# Patient Record
Sex: MALE | Race: WHITE | URBAN - NONMETROPOLITAN AREA
[De-identification: names, ages, dates, MRNs, and addresses within clinical notes are randomized per-mention and may not be internally consistent; named-entity substitution may affect disease eponyms.]

---

## 2017-06-15 ENCOUNTER — OFFICE VISIT - GICH (OUTPATIENT)
Dept: FAMILY MEDICINE | Facility: OTHER | Age: 47
End: 2017-06-15

## 2017-06-15 ENCOUNTER — HISTORY (OUTPATIENT)
Dept: FAMILY MEDICINE | Facility: OTHER | Age: 47
End: 2017-06-15

## 2017-06-15 DIAGNOSIS — F41.9 ANXIETY DISORDER: ICD-10-CM

## 2017-06-15 DIAGNOSIS — G89.29 OTHER CHRONIC PAIN: ICD-10-CM

## 2017-06-15 DIAGNOSIS — F25.0 SCHIZOAFFECTIVE DISORDER, BIPOLAR TYPE (H): ICD-10-CM

## 2017-06-15 DIAGNOSIS — M54.17 RADICULOPATHY OF LUMBOSACRAL REGION: ICD-10-CM

## 2017-06-15 DIAGNOSIS — M54.2 CERVICALGIA: ICD-10-CM

## 2017-06-15 DIAGNOSIS — F17.219 CIGARETTE NICOTINE DEPENDENCE WITH NICOTINE-INDUCED DISORDER: ICD-10-CM

## 2017-06-15 DIAGNOSIS — F32.9 MAJOR DEPRESSIVE DISORDER, SINGLE EPISODE: ICD-10-CM

## 2017-06-15 DIAGNOSIS — G43.911 INTRACTABLE MIGRAINE WITH STATUS MIGRAINOSUS: ICD-10-CM

## 2017-06-15 DIAGNOSIS — F31.77 BIPOLAR DISORDER, IN PARTIAL REMISSION, MOST RECENT EPISODE MIXED (H): ICD-10-CM

## 2017-06-15 ASSESSMENT — ANXIETY QUESTIONNAIRES
7. FEELING AFRAID AS IF SOMETHING AWFUL MIGHT HAPPEN: NOT AT ALL
5. BEING SO RESTLESS THAT IT IS HARD TO SIT STILL: SEVERAL DAYS
6. BECOMING EASILY ANNOYED OR IRRITABLE: SEVERAL DAYS
GAD7 TOTAL SCORE: 2
2. NOT BEING ABLE TO STOP OR CONTROL WORRYING: NOT AT ALL
3. WORRYING TOO MUCH ABOUT DIFFERENT THINGS: NOT AT ALL
4. TROUBLE RELAXING: NOT AT ALL
1. FEELING NERVOUS, ANXIOUS, OR ON EDGE: NOT AT ALL

## 2017-06-15 ASSESSMENT — PATIENT HEALTH QUESTIONNAIRE - PHQ9: SUM OF ALL RESPONSES TO PHQ QUESTIONS 1-9: 2

## 2017-06-19 ENCOUNTER — COMMUNICATION - GICH (OUTPATIENT)
Dept: FAMILY MEDICINE | Facility: OTHER | Age: 47
End: 2017-06-19

## 2017-06-19 DIAGNOSIS — H60.90 OTITIS EXTERNA: ICD-10-CM

## 2017-06-22 ENCOUNTER — COMMUNICATION - GICH (OUTPATIENT)
Dept: FAMILY MEDICINE | Facility: OTHER | Age: 47
End: 2017-06-22

## 2017-07-06 ENCOUNTER — COMMUNICATION - GICH (OUTPATIENT)
Dept: FAMILY MEDICINE | Facility: OTHER | Age: 47
End: 2017-07-06

## 2017-07-06 DIAGNOSIS — M54.41 LOW BACK PAIN WITH RIGHT-SIDED SCIATICA: ICD-10-CM

## 2017-07-06 DIAGNOSIS — G89.29 OTHER CHRONIC PAIN: ICD-10-CM

## 2017-07-06 DIAGNOSIS — G89.4 CHRONIC PAIN SYNDROME: ICD-10-CM

## 2017-07-06 DIAGNOSIS — M54.42 LOW BACK PAIN WITH LEFT-SIDED SCIATICA: ICD-10-CM

## 2017-07-07 ENCOUNTER — COMMUNICATION - GICH (OUTPATIENT)
Dept: FAMILY MEDICINE | Facility: OTHER | Age: 47
End: 2017-07-07

## 2017-07-07 ENCOUNTER — AMBULATORY - GICH (OUTPATIENT)
Dept: SCHEDULING | Facility: OTHER | Age: 47
End: 2017-07-07

## 2017-07-07 DIAGNOSIS — F32.9 MAJOR DEPRESSIVE DISORDER, SINGLE EPISODE: ICD-10-CM

## 2017-07-14 ENCOUNTER — AMBULATORY - GICH (OUTPATIENT)
Dept: LAB | Facility: OTHER | Age: 47
End: 2017-07-14

## 2017-07-14 ENCOUNTER — COMMUNICATION - GICH (OUTPATIENT)
Dept: FAMILY MEDICINE | Facility: OTHER | Age: 47
End: 2017-07-14

## 2017-07-14 DIAGNOSIS — M54.17 RADICULOPATHY OF LUMBOSACRAL REGION: ICD-10-CM

## 2017-07-17 ENCOUNTER — COMMUNICATION - GICH (OUTPATIENT)
Dept: FAMILY MEDICINE | Facility: OTHER | Age: 47
End: 2017-07-17

## 2017-07-17 DIAGNOSIS — F32.9 MAJOR DEPRESSIVE DISORDER, SINGLE EPISODE: ICD-10-CM

## 2017-07-20 ENCOUNTER — COMMUNICATION - GICH (OUTPATIENT)
Dept: FAMILY MEDICINE | Facility: OTHER | Age: 47
End: 2017-07-20

## 2017-07-20 DIAGNOSIS — F32.9 MAJOR DEPRESSIVE DISORDER, SINGLE EPISODE: ICD-10-CM

## 2017-07-21 LAB
6-MONOACETYL MORPHINE - HISTORICAL: ABNORMAL NG/ML
AMPHETAMINE URINE - HISTORICAL: ABNORMAL NG/ML
BARBITURATE URINE - HISTORICAL: ABNORMAL NG/ML
BENZODIAZEPINE URINE - HISTORICAL: ABNORMAL NG/ML
BUPRENORPHRINE URINE - HISTORICAL: ABNORMAL NG/ML
COCAINE METAB URINE - HISTORICAL: ABNORMAL NG/ML
CREAT UR - HISTORICAL: 88 MG/DL
ETHYLGLUCURONIDE URINE - HISTORICAL: ABNORMAL NG/ML
FENTANYL URINE - HISTORICAL: ABNORMAL NG/ML
MASS SPECTROMETRY URINE - HISTORICAL: ABNORMAL
METHADONE URINE - HISTORICAL: ABNORMAL NG/ML
OPIATES URINE - HISTORICAL: ABNORMAL NG/ML
OXYCODONE URINE - HISTORICAL: ABNORMAL NG/ML
PH URINE - HISTORICAL: 7.5
PROPOXYPHENE URINE - HISTORICAL: ABNORMAL NG/ML
THC 50 URINE - HISTORICAL: ABNORMAL NG/ML
TRAMADOL - HISTORICAL: ABNORMAL NG/ML

## 2017-07-22 ENCOUNTER — AMBULATORY - GICH (OUTPATIENT)
Dept: FAMILY MEDICINE | Facility: OTHER | Age: 47
End: 2017-07-22

## 2017-07-26 ENCOUNTER — COMMUNICATION - GICH (OUTPATIENT)
Dept: FAMILY MEDICINE | Facility: OTHER | Age: 47
End: 2017-07-26

## 2017-07-26 DIAGNOSIS — F32.9 MAJOR DEPRESSIVE DISORDER, SINGLE EPISODE: ICD-10-CM

## 2017-07-27 ENCOUNTER — COMMUNICATION - GICH (OUTPATIENT)
Dept: FAMILY MEDICINE | Facility: OTHER | Age: 47
End: 2017-07-27

## 2017-07-27 DIAGNOSIS — K21.9 GASTRO-ESOPHAGEAL REFLUX DISEASE WITHOUT ESOPHAGITIS: ICD-10-CM

## 2017-09-17 ENCOUNTER — COMMUNICATION - GICH (OUTPATIENT)
Dept: FAMILY MEDICINE | Facility: OTHER | Age: 47
End: 2017-09-17

## 2017-09-17 DIAGNOSIS — F32.9 MAJOR DEPRESSIVE DISORDER, SINGLE EPISODE: ICD-10-CM

## 2017-12-28 NOTE — TELEPHONE ENCOUNTER
Patient Information     Patient Name MRN Sex Torres Mason [id number] Male 1970      Telephone Encounter by Ning Boss at 2017  9:49 AM     Author:  Ning Boss Service:  (none) Author Type:  (none)     Filed:  2017  9:51 AM Encounter Date:  2017 Status:  Signed     :  Ning Boss            Spoke with patient's wife and notified of RX sent to Ivonne Boss LPN......................2017 9:50 AM

## 2017-12-28 NOTE — TELEPHONE ENCOUNTER
Patient Information     Patient Name MRN Sex Torres Mason [id number] Male 1970      Telephone Encounter by Julia Huber MD at 2017  6:05 PM     Author:  Julia Huber MD Service:  (none) Author Type:  Physician     Filed:  2017  6:06 PM Encounter Date:  2017 Status:  Signed     :  Julia Huber MD (Physician)            Can try cipro hc

## 2017-12-28 NOTE — TELEPHONE ENCOUNTER
Patient Information     Patient Name MRN Sex Torres Mason [id number] Male 1970      Telephone Encounter by Ning Boss at 2017  8:20 AM     Author:  Ning Boss Service:  (none) Author Type:  (none)     Filed:  2017  8:23 AM Encounter Date:  2017 Status:  Signed     :  Ning Boss            Spoke with Jes, she stated when Torres was seen at his apt. On 6/15/17 Julia Huber MD   noticed his right ear was swollen and was going to prescribe some drops for it. Would like RX sent to [facility] to be picked up today if possible. Ning Boss LPN......................2017 8:22 AM

## 2017-12-28 NOTE — TELEPHONE ENCOUNTER
Patient Information     Patient Name MRN Sex Torres Mason [id number] Male 1970      Telephone Encounter by Julia Huber MD at 2017  9:15 AM     Author:  Julia Huber MD Service:  (none) Author Type:  Physician     Filed:  2017  9:16 AM Encounter Date:  2017 Status:  Signed     :  Julia Huber MD (Physician)            I do not see a question .

## 2017-12-28 NOTE — TELEPHONE ENCOUNTER
Patient Information     Patient Name MRN Sex Torres Mason [id number] Male 1970      Telephone Encounter by Julia Huber MD at 2017  9:46 AM     Author:  Julia Huber MD Service:  (none) Author Type:  Physician     Filed:  2017  9:47 AM Encounter Date:  2017 Status:  Signed     :  Julia Huber MD (Physician)            Done

## 2017-12-28 NOTE — TELEPHONE ENCOUNTER
Patient Information     Patient Name MRN Sex Torres Mason [id number] Male 1970      Telephone Encounter by Enoch Coleman LPN at 2017  8:12 AM     Author:  Enoch Coleman LPN Service:  (none) Author Type:  NURS- Licensed Practical Nurse     Filed:  2017  8:13 AM Encounter Date:  2017 Status:  Signed     :  Enoch Coleman LPN (NURS- Licensed Practical Nurse)            Attempted to reach patient. No answer/machine.  Enoch Coleman LPN ..............2017 8:13 AM

## 2017-12-28 NOTE — TELEPHONE ENCOUNTER
Patient Information     Patient Name MRN Sex Torres Mason [id number] Male 1970      Telephone Encounter by Philly Porras RN at 2017  4:09 PM     Author:  Philly Porras RN Service:  (none) Author Type:  NURS- Registered Nurse     Filed:  2017  4:18 PM Encounter Date:  2017 Status:  Signed     :  Philly Porras RN (NURS- Registered Nurse)            Depression-in adults 18 and over  Serotonin/Norepinephrine Reuptake Inhibitors    Office visit in the past 12 months or as indicated in chart.  Should have clinic visit 1-2 months after initial prescription.    Last visit with TERRANCE MONTOYA was on: 06/15/2017 in [facility]  Next visit with TERRANCE MONTOYA is on: No future appointment listed with this provider  Next visit with Family Practice is on: No future appointment listed in this department    PHQ Depression Screening 6/15/2017   Date of PHQ exam (doc flow) 6/15/2017   1. Lack of interest/pleasure 0 - Not at all   2. Feeling down/depressed 0 - Not at all   PHQ-2 TOTAL SCORE 0   3. Trouble sleeping 1 - Several days   4. Decreased energy 1 - Several days   5. Appetite change 0 - Not at all   6. Feelings of failure 0 - Not at all   7. Trouble concentrating 0 - Not at all   8. Activity level 0 - Not at all   9. Hurting yourself 0 - Not at all   PHQ-9 TOTAL SCORE 2   PHQ-9 Severity Level none   Functional Impairment not difficult at all   Some recent data might be hidden      Prescription refilled per RN Medication Refill Policy.................... Philly Porras RN ....................  2017   4:18 PM

## 2017-12-28 NOTE — TELEPHONE ENCOUNTER
Patient Information     Patient Name MRN Sex Torres Mason [id number] Male 1970      Telephone Encounter by Erendira Briones RN at 2017  2:51 PM     Author:  Erendira Briones RN Service:  (none) Author Type:  NURS- Registered Nurse     Filed:  2017  2:53 PM Encounter Date:  2017 Status:  Signed     :  Erendira Briones RN (NURS- Registered Nurse)            This is a Refill request from: [facility]   Name of Medication: Trazodone  Quantity requested: 30  Last fill date: 9/15/2015   Due for refill: yes  Last visit with JULIA HUBER was on: 06/15/2017 in [facility]  PCP:  Julia Huber MD  Controlled Substance Agreement:  n/a   Diagnosis r/t this medication request: depression     Unable to complete prescription refill per RN Medication Refill Policy.................... Erendira Briones RN ....................  2017   2:51 PM

## 2017-12-28 NOTE — TELEPHONE ENCOUNTER
Patient Information     Patient Name MRN Sex Torres Mason [id number] Male 1970      Telephone Encounter by Diane Martinez at 2017  2:10 PM     Author:  Diane Martinez Service:  (none) Author Type:  NURS- Student Practical Nurse     Filed:  2017  2:10 PM Encounter Date:  2017 Status:  Signed     :  Diane Martinez (NURS- Student Practical Nurse)            Called and after proper verification, informed Jes of below message from PCP. Patient states understanding and no further questions at this time.    Diane Martinez ....................  2017   2:10 PM

## 2017-12-28 NOTE — TELEPHONE ENCOUNTER
Patient Information     Patient Name MRN Sex Torres Mason [id number] Male 1970      Telephone Encounter by Sophie Medina at 2017  3:09 PM     Author:  Sophie Medina Service:  (none) Author Type:  (none)     Filed:  2017  3:09 PM Encounter Date:  2017 Status:  Signed     :  Sophie Medina            OPEN CALL    Encounter remains open, please close if appropriate. Thank you!    Sophie Medina ....................  2017   3:09 PM

## 2017-12-28 NOTE — PROGRESS NOTES
Patient Information     Patient Name MRN Sex Torres Mason [id number] Male 1970      Progress Notes by Julia Huber MD at 6/15/2017  1:45 PM     Author:  Julia Huber MD Service:  (none) Author Type:  Physician     Filed:  2017 11:24 PM Encounter Date:  6/15/2017 Status:  Signed     :  Julia Huber MD (Physician)            Nursing Notes:   Jessica Boss  6/15/2017  2:41 PM  Signed  Patient states that he is here for medication refills and he states he is not here for a physical.   Patient moved back home from [place].    PHQ Depression Screening 2015 6/15/2017   Date of PHQ exam (doc flow) 2015 6/15/2017   1. Lack of interest/pleasure 3 - Nearly every day 0 - Not at all   2. Feeling down/depressed 3 - Nearly every day 0 - Not at all   PHQ-2 TOTAL SCORE 6 0   3. Trouble sleeping 3 - Nearly every day 1 - Several days   4. Decreased energy 2 - More than half the days 1 - Several days   5. Appetite change 1 - Several days 0 - Not at all   6. Feelings of failure 1 - Several days 0 - Not at all   7. Trouble concentrating 2 - More than half the days 0 - Not at all   8. Activity level 3 - Nearly every day 0 - Not at all   9. Hurting yourself 0 - Not at all 0 - Not at all   PHQ-9 TOTAL SCORE 18 2   PHQ-9 Severity Level moderately severe none   Functional Impairment extremely difficult not difficult at all     ADRY-7 ANXIETY SCREENING 6/15/2017   ADRY date (doc flow) 6/15/2017   Nervous, anxious 0   Cannot stop worrying 0   Worry about different things 0   Cannot relax 0   Feeling restless 1   Easily annoyed/irritated 1   Afraid of awful event 0   Score 2   Severity none       Jessica Boss LPN........................6/15/2017  2:00 PM     Subjective:  Torres Hubbard is a 47 y.o. male who presents for to re-establish care and med refill    Anxiety/ Depression   Patient with a history of anxiety.  He is taking wellburin 150 mg xl in am.  He is on cymbalta This  has helped him cut back on cigarettes but not yet quit.   He continues on klonopin 0.5 mg in am , noon and 1 mg at hs.  He was placed on this dose by psychiatrist after being diagnosed with bipolar and schizoaffective disorder.   Patient admits he is not sure what that means.  He says with th e medication is is significantly more functional.    - clonazePAM (KLONOPIN) 1 mg tablet; Taking 1/2 tablet in the am, 1/2 tablet at noon and 1 at night  Dispense: 75 tablet; Refill: 3  - buPROPion (WELLBUTRIN XL) 150 mg Extended-Release tablet; Take 1 tablet by mouth every morning.  Dispense: 90 tablet; Refill: 3    Neck pain, chronic   patient with chronic neck pain.  On Klonopin.  Would like to try some pain patches as klonopin make him tired.   - lidocaine 5% (LIDODERM) 5 % patch; Apply 1 patch to painful area of skin for up to 12 hours within a 24-hour period.  Dispense: 30 Patch; Refill: 5            Schizoaffective disorder, bipolar type (HC)  Patient is on latuda in additiona to above medication.  He indicates he is tolerating the 40 mg of Latuda.      Lumbar back pain with radiculopathy affecting left lower extremity  Patient has had a history of chronic neck, lumbar back pain.  He has had OA f bilateral knees.  He has had some success with Physical therapy in the past.    -            Cigarette nicotine dependence with nicotine-induced disorder  He has cut back but has not quit.   He smokes 1/2- 3/4 ppd.   He is under stress with recent move home and contemplating move to [place]. He admits to use of THC, but quit 3 weeks ago.  He is here with is wife and son.   They are supportive.       Allergies      Allergen   Reactions     Isosorbide Dinitrate  Headache     Lactose  Nausea Only     Bloating        Current Outpatient Prescriptions on File Prior to Visit       Medication  Sig Dispense Refill     albuterol HFA (PROAIR HFA) 90 mcg/actuation inhaler Inhale 2 Puffs by mouth 4 times daily if needed. 3 Inhaler 0      ascorbic acid (VITAMIN C) 1,000 mg tablet Take 1 tablet by mouth once daily.  0     aspirin-acetaminophen-caffeine, 250-250-65 mg, (EXCEDRIN EXTRA STRENGTH) 250-250-65 mg tablet Take 2 tablets by mouth every 6 hours if needed for Headache. Max acetaminophen dose: 4000mg in 24 hrs.  0     Calcium carbonate (OYSTERSHELL CALCIUM) 500 mg tablet Take 1 tablet by mouth once daily.  0     Cane Single Point Cane for home use. For  Right knee instability and pain 1 Device 0     FLOVENT  mcg/actuation inhaler 2 Puffs 2 times daily. PRN wheezing       fluticasone (50 mcg per actuation) nasal solution (FLONASE) Inhale 2 Sprays into both nostrils once daily. 1 Bottle 3     Glucosamine HCl 500 mg tablet Take 1 tablet by mouth once daily.  0     LATUDA 60 mg tab Take 60 mg by mouth with dinner.       lidocaine 5% (LIDODERM) 5 %(700 mg/patch) patch APPLY 1 PATCH TO PAINFUL AREA OF SKIN FOR UP TO 12 HOURS WITHIN A 24 HOUR PERIOD 30 Patch 0     loratadine (CLARITIN) 10 mg tablet Take 1 tablet by mouth once daily. 210 tablet 0     meloxicam (MOBIC) 7.5 mg tablet One orally bid 62 tablet 0     omeprazole (PRILOSEC) 20 mg Delayed-Release capsule Take 1 capsule by mouth once daily before a meal. 90 capsule 3     traZODone (DESYREL) 100 mg tablet TAKE ONE TABLET BY MOUTH AT BEDTIME 30 tablet 0     No current facility-administered medications on file prior to visit.        Problem List/PMH: reviewed in EMR    Social Hx:  Social History        Substance Use Topics          Smoking status:   Current Every Day Smoker      Packs/day:  0.50      Types:  Cigarettes      Smokeless tobacco:   Never Used       Comment: thinking about quiting        Alcohol use   6.0 oz/week     12 Standard drinks or equivalent per week        Comment: rare - occ.        Social History Narrative    Moved back to [facility] after living in [place] for 7 years.   He is unemployed due to chronic pain and mental health issues, hopes to become a motor  "cycle .  He has 7 year old son, Jasiel and his Wife is Jes.  + marital discord.  + THC use, + nicotine use.          Family Hx:   Family History       Problem   Relation Age of Onset     Other  Son      migraines         Objective:  /60  Pulse 84  Ht 1.803 m (5' 11\")  Wt 78 kg (172 lb)  BMI 23.99 kg/m2   He is alert, oriented times three.  Slightly slow in speech.  Smiling cooperative.   flat affect;  Lungs clear. CV regular without murmur.  Abdomen is soft, flat.  No cvat , no masses.  Ext without edema.  OA changes of knees .  Multiple myofascial trigger points paralumbar,bilateral SI and trapezius.  Full ROM of neck, shoulders.  Fair to good internal and exteranl rotation of bilateral hips.  Skin warm without rash    Assessment:    ICD-10-CM    1. Schizoaffective disorder, bipolar type (HC) F25.0 lurasidone (LATUDA) 40 mg tablet   2. Anxiety F41.9 clonazePAM (KLONOPIN) 1 mg tablet      buPROPion (WELLBUTRIN XL) 150 mg Extended-Release tablet   3. Neck pain, chronic M54.2 lidocaine 5% (LIDODERM) 5 % patch     G89.29    4. Intractable migraine with status migrainosus, unspecified migraine type G43.911 DISCONTINUED: topiramate (TOPAMAX) 50 mg tablet   5. Depression, unspecified depression type F32.9 DULoxetine (CYMBALTA) 60 mg Delayed-release capsule   6. Lumbar back pain with radiculopathy affecting left lower extremity M54.17 lidocaine 5% (LIDODERM) 5 % patch      COMPLIANCE DRUG ANALYSIS      AMB CONSULT TO PHYSICAL THERAPY   7. Bipolar 1 disorder, mixed, partial remission (HC) F31.77    8. Cigarette nicotine dependence with nicotine-induced disorder F17.219       Discussed with patient increased morbidity and mortality / death associated with nicotine use.   Reviewed a variety of OTC nicotine replacement products available, identifying triggers and developing a strategy for successful nicotine cessation.       Refilled meds per epic orders.   Compliance drug screen prior to next visit. "   Will try to obtain psychatiry report.     Encouraged counseling, crisis team phone number provided, names of local psychologists provided,  nutritious eating, behavior acivation and 30 minutes of exercise.   Patient did contract for life. Has support of family and friends  Discussed relaxation techniques.    Follow up in 2-4 weeks sooner if necessary.    Total time with patient 30 minutes greater than half counseling face to face on anxiety      Plan:   -- Expected clinical course discussed   -- Medications and their side effects discussed  Patient Instructions   1.  Lab only appointment in 2 weeks  2.  Recommend seeing psychiatry to consider genetic testing for psychiatric meds  and continued follow up of schizophrenic ;  Bipolar med management.   3.  Recommend counseling   4.  Return to clinic fasting  5.   Work on smoking cessation. Handouts as below. Identify triggers to smoking.  Consider over-the-counter nicotine patch or reading Felipe Ramos's , ' Easy way to Stop Smoking'.    6.  Aquatic physical therapy  for back           [facility]: Managing Your Pain     Pain  Pain is your body s response to injury, illness or surgery. It can come on suddenly (acute) or last a long time (chronic). Pain can be constant or it can come and go.  Severe pain affects every part of your life: eating, sleeping, work, interests and relationships. It can cause you to be stressed, depressed, tired or angry.  Pain is unique. No two people feel pain in the same ways. Pain that is intense to one person may be mild to another.  Your health care team is committed to helping you get well and manage your pain.  Treatments to Manage Pain  Keeping your pain managed can help you be more comfortable, get back to your normal routine, and promote healing.  Your health care team will work with you to manage your pain. Your options may include:           medicines          physical therapy           heat or cold therapy           nerve  blocks           integrative therapies:acupuncture, relaxation techniques, massage therapy or music.  What To Expect From Your Health Care Team  When you are admitted to the hospital:           A nurse will ask you if you have pain. This includes all pain, not just pain from an illness or surgery. For example, tell your nurse if you have pain from an old injury.           A nurse will review your medicines with you. Do you take ibuprofen or acetaminophen for pain at home? If so, it is just as important to know about these medicines as it is to know about your prescriptions.           Your nurses will ask you to rate the strength of your pain using a pain scale. (There is no  correct  number for your pain level.) They will also ask you what your pain feels like.    When you are getting ready for tests, procedures or surgery:           Your health care team will tell you what to expect and how your pain will be managed. Ask if you still have questions or think your health care team missed something.           Your health care team may recommend that you have pain medicine before a procedure.           If you take long-acting pain medicines, you may be asked to take a dose on the morning of surgery. Check with your surgeon or regular doctor before surgery.  Your Role in Managing Pain  You are the only one who knows where and how severe your pain is. You have a key role in managing your pain.  Tell your nurse or doctor if you have pain. Your health care team will create a pain relief plan to meet your needs. Tell your nurse or doctor:           what makes your pain better or worse           what methods of pain control have worked or have not worked well in the past           if your pain starts to get worse           if you feel new pain.  Your nurse will work with you to create a pain goal during your hospital stay.  Questions and Answers About Pain Medicines  Q: When and how often should you take pain medicine?    A:  Take pain medicine when your pain begins. If you know your pain gets worse with activity, take the medicine before you do the activity. Don t wait for the pain to get worse before taking medicine. Tablets or pills may take up to 20 minutes to begin working.  Q: How can you take pain medicine?    A: There are many ways to give medicine for pain. Your doctor will help you decide which way might be best for you:           tablets or pills           intravenous (into a vein)           patient controlled analgesia pump           injection (shot) through the skin           shot or infusion in the spinal canal.  Q: What are the side effects?    A: All medicines have possible side effects. When side effects occur, it is usually within a few hours after taking the medicine. Most side effects can be managed and go away in time. Tell your doctor or nurse right away if you have:           constipation          sleepiness          dizziness          itching, rash or both           upset stomach or throwing up           slowed breathing           confusion.  Before You Go Home           Your doctor or health care team will give you directions for managing your pain at home. Be sure to have written instructions with a health care provider s name and number who will manage your pain after you go home.           It is important you follow your doctor s directions for taking pain medicine. If you need help, ask your doctor or pharmacist.           If you have concerns or side effects from pain medicine, call the doctor who prescribed the medicine,   or call your regular doctor.    2016 [employer]. TM - A TRADEMARK OF [employer]  OTHER TRADEMARKS USED ARE OWNED BY THEIR RESPECTIVE OWNERS  THIS BROCHURE DOES NOT REPLACE MEDICAL OR PROFESSIONAL ADVICE; IT IS ONLY A GUIDE  [facility]-[id number] (8/16)      Index   Biofeedback   ________________________________________________________________________  KEY POINTS    Biofeedback  is a therapy that trains you to be aware of your body and how it works. This may help reduce stress and give you more control over several kinds of health problems.    Biofeedback uses electronic equipment that gives you feedback about what is happening with your muscles, breathing, heartbeat, or other body functions. During treatment sessions you learn to relax, and notice how that changes the feedback.  ________________________________________________________________________  What is biofeedback?   Biofeedback is a therapy that trains you to be aware of your body and how it works. Your body controls your heart rate, blood pressure, and skin temperature without you thinking about it. You can learn to control some of your body processes with biofeedback. This may help reduce stress and give you more control over your health. Stress can cause or worsen many physical and mental health conditions.  When is it used?   Along with medical care, biofeedback can be helpful for:    Tension and migraine headaches    Pain such as chronic back or neck pain    High blood pressure    Anxiety disorders    Insomnia    Attention-deficit/hyperactivity disorder (ADHD)    Grinding of the teeth    Urinary incontinence    Constipation    Depression    Asthma    TMJ (jaw joint) problems    Irritable bowel syndrome    Chronic fatigue syndrome    Raynaud s Disease  What happens during a therapy session?   Your therapist will connect you to electronic equipment that gives you information about your body functions. The equipment gives feedback that you can see on a screen or hear with beeps or tones. The feedback tells you what is happening with your body functions. During the treatment sessions the biofeedback therapist will guide you to relax and pay attention to how you feel and how your feelings change the feedback.  There are several common types of biofeedback:    Thermal biofeedback: Small pads or clips are attached to your hand or  "foot to measure skin temperature. Skin temperature is a good way to tell how tense or relaxed you are. When you are relaxed, the skin temperature of your hands and feet rises.    Electromyogram (EMG) biofeedback: Small pads are put on your skin of the neck, back, or forehead to measure muscle activity.    Electroencephalogram (EEG) biofeedback: One or more small pads are placed on your scalp. They measure electrical activity in the brain.    Electrocardiogram (ECG or EKG): Small pads are put on your arms, legs, or other parts of your body to measure heart rate    Pneumograph: A gauge is placed around your chest to measure your breathing rate and patterns.  It may take between 6 and 30 therapy sessions to teach you how to control your symptoms. You may need to practice relaxation or muscle control for 5 or 10 minutes every day between biofeedback sessions. This helps you get more out of your treatment sessions. After you finish treatment, you may need occasional \"booster\" sessions to help keep the control you have learned.  How do I find a therapist?  Ask questions and get referrals from people you know and trust. You could check with:    Your healthcare provider    Friends or family members who have used biofeedback    Your health insurance company    Your employee assistance program (EAP) at work    Local mental health or human service agencies    Professional associations of psychologists, psychiatrists, or counselors  Developed by [employer].  Adult Advisor 2016.3 published by [employer].  Last modified: 2016-01-13  Last reviewed: 2015-12-17  This content is reviewed periodically and is subject to change as new health information becomes available. The information is intended to inform and educate and is not a replacement for medical evaluation, advice, diagnosis or treatment by a healthcare professional.  References   Adult Advisor 2016.3 Index    Copyright   2016 [employer], a division of [employer] All rights reserved.        Index Irish   Low Back Pain Exercises   Exercises that stretch and strengthen the muscles of your abdomen and spine can help prevent back problems. Strong back and abdominal muscles help you keep good posture, with your spine in its correct position.  If your muscles are tight, take a warm shower or bath before doing the exercises. Exercise on a rug or mat. Wear loose clothing. Don t wear shoes. Stop doing any exercise that causes pain until you have talked with your healthcare provider.  Ask your provider or physical therapist to help you develop an exercise program. Ask your provider how many times a week you need to do the exercises. Remember to start slowly.   Exercises  These exercises are intended only as suggestions. Be sure to check with your provider before starting the exercises.     Abdominal drawing-in maneuver: Lie on your back with your knees bent and your feet flat on the floor. Try to pull your belly button in towards your spine. Hold this position for 15 seconds and then relax. Repeat 5 to 10 times.    Cat and camel: Get down on your hands and knees. Let your stomach sag, allowing your back to curve downward. Hold this position for 5 seconds. Then arch your back and hold for 5 seconds. Do 2 sets of 15.    Quadruped arm and leg raise: Get down on your hands and knees. Pull in your belly button and tighten your abdominal muscles to stiffen your spine. While keeping your abdominals tight, raise one arm and the opposite leg away from you. Hold this position for 5 seconds. Lower your arm and leg slowly and change sides. Do this 10 times on each side.    Pelvic tilt: Lie on your back with your knees bent and your feet flat on the floor. Pull your belly button in towards your spine and push your lower back into the floor, flattening your back. Hold this position for 15 seconds, then relax. Repeat 5 to 10 times.    Partial curl: Lie on your back with your  knees bent and your feet flat on the floor. Draw in your abdomen and tighten your stomach muscles. With your hands stretched out in front of you, curl your upper body forward until your shoulders clear the floor. Hold this position for 3 seconds. Don't hold your breath. It helps to breathe out as you lift your shoulders. Relax back to the floor. Repeat 10 times. Build to 2 sets of 15. To challenge yourself, clasp your hands behind your head and keep your elbows out to your sides.    Gluteal stretch: Lie on your back with both knees bent. Rest your right ankle over the knee of your left leg. Grasp the thigh of the left leg and pull toward your chest. You will feel a stretch along the buttocks and possibly along the outside of your hip. Hold the stretch for 15 to 30 seconds. Then repeat the exercise with your left ankle over your right knee. Do the exercise 3 times with each leg.    Extension exercise  1. Lie face down on the floor for 5 minutes. If this hurts too much, lie face down with a pillow under your stomach. This should relieve your leg or back pain. When you can lie on your stomach for 5 minutes without a pillow, you can continue with Part B of this exercise.  2. After lying on your stomach for 5 minutes, prop yourself up on your elbows for another 5 minutes. If you can do this without having more leg or buttock pain, you can start doing part C of this exercise.  3. Lie on your stomach with your hands under your shoulders. Then press down on your hands and extend your elbows while keeping your hips flat on the floor. Hold for 1 second and lower yourself to the floor. Do 3 to 5 sets of 10 repetitions. Rest for 1 minute between sets. You should have no pain in your legs when you do this, but it is normal to feel some pain in your lower back.  Do this exercise several times a day.    Side plank: Lie on your side with your legs, hips, and shoulders in a straight line. Prop yourself up onto your forearm with your  elbow directly under your shoulder. Lift your hips off the floor and balance on your forearm and the outside of your foot. Try to hold this position for 15 seconds and then slowly lower your hip to the ground. Switch sides and repeat. Work up to holding for 1 minute. This exercise can be made easier by starting with your knees and hips flexed toward your chest.    Prone plank: Lie on your stomach on the floor with your elbows bent and your forearms resting on the floor. Lift your hips and knees off the floor and try to stay in this position while keeping your back flat. Work up to holding this position for at least 1 minute. Do 3 sets.  Exercises to avoid   It s best to avoid the following exercises because they strain the lower back:    Exercises in which you lie on your back and raise and lower both legs together    Full sit-ups or sit-ups with straight legs    Hip twists    Squats with weights  Sports and other activities   In addition to strengthening your back muscles, it s helpful to keep your entire body in shape. Good activities for people with back problems include:    Walking    Bicycling    Swimming    Cross-country skiing    Yoga    Trevon Chi    Pilates  Some sports can hurt your back because of rough contact, twisting, sudden impact, or direct stress on your back. Sports that may be dangerous to your back include:    Basketball    Football    Soccer    Volleyball    Handball    Golf    Weight lifting    Trampoline    Tobogganing    Sledding    Snowmobiling    Snowboarding    Ice hockey  Developed by [employer].  Adult Advisor 2016.3 published by [employer].  Last modified: 2016-05-26  Last reviewed: 2016-05-18  This content is reviewed periodically and is subject to change as new health information becomes available. The information is intended to inform and educate and is not a replacement for medical evaluation, advice, diagnosis or treatment by a healthcare professional.  References   Adult Advisor  2016.3 Index    Copyright   2016 [employer], a division of [employer] All rights reserved.             Electronically signed by Julia Huber MD

## 2017-12-28 NOTE — ADDENDUM NOTE
Patient Information     Patient Name MRN Sex Torres Mason [id number] Male 1970      Addendum Note by Julia Huber MD at 2017 12:07 PM     Author:  Julia Huber MD Service:  (none) Author Type:  Physician     Filed:  2017 12:07 PM Encounter Date:  2017 Status:  Signed     :  Julia Huber MD (Physician)       Addended by: JULIA HUBER on: 2017 12:07 PM        Modules accepted: Orders

## 2017-12-28 NOTE — ADDENDUM NOTE
Patient Information     Patient Name MRN Sex Torres Mason [id number] Male 1970      Addendum Note by Erendira Chawla RN at 2017  3:25 PM     Author:  Erendira Chawla RN Service:  (none) Author Type:  NURS- Registered Nurse     Filed:  2017  3:25 PM Encounter Date:  2017 Status:  Signed     :  Erendira Chawla RN (NURS- Registered Nurse)       Addended by: ERENDIRA CHAWLA on: 2017 03:25 PM        Modules accepted: Orders

## 2017-12-28 NOTE — TELEPHONE ENCOUNTER
Patient Information     Patient Name MRN Sex Torres Mason [id number] Male 1970      Telephone Encounter by Julia Huber MD at 2017 12:07 PM     Author:  Julia Huber MD Service:  (none) Author Type:  Physician     Filed:  2017 12:07 PM Encounter Date:  2017 Status:  Signed     :  Julia Huber MD (Physician)            Referral provided

## 2017-12-28 NOTE — TELEPHONE ENCOUNTER
Patient Information     Patient Name MRN Sex Torres Mason [id number] Male 1970      Telephone Encounter by Diane Martinez at 2017 11:12 AM     Author:  Diane Martinez Service:  (none) Author Type:  NURS- Student Practical Nurse     Filed:  2017 11:13 AM Encounter Date:  2017 Status:  Signed     :  Diane Martinez (NURS- Student Practical Nurse)            Called and spoke with patients wife after proper verification. Wife stated that PCP suggested pool therapy for his back. However, is now wondering how they go about setting that up. Who do they call and will their insurance cover it? Please advise.  Diane Martinez ....................  2017   11:13 AM

## 2017-12-28 NOTE — TELEPHONE ENCOUNTER
Patient Information     Patient Name MRN Sex Torres Mason [id number] Male 1970      Telephone Encounter by Monse Cohn RN at 2017  2:05 PM     Author:  Monse Cohn RN Service:  (none) Author Type:  NURS- Registered Nurse     Filed:  2017  2:09 PM Encounter Date:  2017 Status:  Signed     :  Monse Cohn RN (NURS- Registered Nurse)            Unable to find a dx code.  Unable to complete prescription refill per RN Medication Refill Policy.................... MONSE COHN RN ....................  2017   2:08 PM        This is a Refill request from: [facility]  Name of Medication: Prilosec 20 mg  Quantity requested: 90  Last fill date: 4/3/15  Last visit with JULIA HUBER was on: 06/15/2017 in [facility]  PCP:  Julia Huber MD  Controlled Substance Agreement:  na   Diagnosis r/t this medication request: not listed

## 2017-12-28 NOTE — TELEPHONE ENCOUNTER
Patient Information     Patient Name MRN Sex Torres Mason [id number] Male 1970      Telephone Encounter by Erendira Briones RN at 2017  3:24 PM     Author:  Erendira Briones RN Service:  (none) Author Type:  NURS- Registered Nurse     Filed:  2017  3:25 PM Encounter Date:  2017 Status:  Signed     :  Erendira Briones RN (NURS- Registered Nurse)            PATIENT IN STORE    Pharmacy now reports patient states he takes 2 tabs at bedtime. Unable to find this in chart review. Please advise.    Erendira Briones RN........2017 3:24 PM

## 2017-12-28 NOTE — TELEPHONE ENCOUNTER
Patient Information     Patient Name MRN Sex Torres Mason [id number] Male 1970      Telephone Encounter by Jessica Boss at 2017  4:32 PM     Author:  Jessica Boss Service:  (none) Author Type:  (none)     Filed:  2017  4:32 PM Encounter Date:  2017 Status:  Signed     :  Jessica Boss            Hydrocortisone-acetic acid not covered by insurance would you like to do a Prior Auth or try something else?   Jessica Boss LPN........................2017  4:32 PM

## 2017-12-28 NOTE — TELEPHONE ENCOUNTER
Patient Information     Patient Name MRN Sex Torres Mason [id number] Male 1970      Telephone Encounter by Philly Porras RN at 2017  9:48 AM     Author:  Philly Porras RN Service:  (none) Author Type:  NURS- Registered Nurse     Filed:  2017 10:03 AM Encounter Date:  2017 Status:  Signed     :  Philly Porras RN (NURS- Registered Nurse)            This request has already been routed to  and refused.  Unable to complete prescription refill per RN Medication Refill Policy.................... Philly Porras RN ....................  2017   10:02 AM

## 2017-12-28 NOTE — PATIENT INSTRUCTIONS
Patient Information     Patient Name MRN Sex Torres Mason [id number] Male 1970      Patient Instructions by Julia Huber MD at 6/15/2017  3:04 PM     Author:  Julia Huber MD  Service:  (none) Author Type:  Physician     Filed:  6/15/2017  3:04 PM  Encounter Date:  6/15/2017 Status:  Addendum     :  Julia Huber MD (Physician)        Related Notes: Original Note by Julia Huber MD (Physician) filed at 6/15/2017  3:04 PM            1.  Lab only appointment in 2 weeks  2.  Recommend seeing psychiatry to consider genetic testing for psychiatric meds  and continued follow up of schizophrenic ;  Bipolar med management.   3.  Recommend counseling   4.  Return to clinic fasting  5.   Work on smoking cessation. Handouts as below. Identify triggers to smoking.  Consider over-the-counter nicotine patch or reading Felipe Ramos's , ' Easy way to Stop Smoking'.    6.  Aquatic physical therapy  for back           [facility]: Managing Your Pain     Pain  Pain is your body s response to injury, illness or surgery. It can come on suddenly (acute) or last a long time (chronic). Pain can be constant or it can come and go.  Severe pain affects every part of your life: eating, sleeping, work, interests and relationships. It can cause you to be stressed, depressed, tired or angry.  Pain is unique. No two people feel pain in the same ways. Pain that is intense to one person may be mild to another.  Your health care team is committed to helping you get well and manage your pain.  Treatments to Manage Pain  Keeping your pain managed can help you be more comfortable, get back to your normal routine, and promote healing.  Your health care team will work with you to manage your pain. Your options may include:           medicines          physical therapy           heat or cold therapy           nerve blocks           integrative therapies: acupuncture, relaxation  techniques, massage therapy or music.  What To Expect From Your Health Care Team  When you are admitted to the hospital:           A nurse will ask you if you have pain. This includes all pain, not just pain from an illness or surgery. For example, tell your nurse if you have pain from an old injury.           A nurse will review your medicines with you. Do you take ibuprofen or acetaminophen for pain at home? If so, it is just as important to know about these medicines as it is to know about your prescriptions.           Your nurses will ask you to rate the strength of your pain using a pain scale. (There is no  correct  number for your pain level.) They will also ask you what your pain feels like.    When you are getting ready for tests, procedures or surgery:           Your health care team will tell you what to expect and how your pain will be managed. Ask if you still have questions or think your health care team missed something.           Your health care team may recommend that you have pain medicine before a procedure.           If you take long-acting pain medicines, you may be asked to take a dose on the morning of surgery. Check with your surgeon or regular doctor before surgery.  Your Role in Managing Pain  You are the only one who knows where and how severe your pain is. You have a key role in managing your pain.  Tell your nurse or doctor if you have pain. Your health care team will create a pain relief plan to meet your needs. Tell your nurse or doctor:           what makes your pain better or worse           what methods of pain control have worked or have not worked well in the past           if your pain starts to get worse           if you feel new pain.  Your nurse will work with you to create a pain goal during your hospital stay.  Questions and Answers About Pain Medicines  Q: When and how often should you take pain medicine?    A: Take pain medicine when your pain begins. If you know your  pain gets worse with activity, take the medicine before you do the activity. Don t wait for the pain to get worse before taking medicine. Tablets or pills may take up to 20 minutes to begin working.  Q: How can you take pain medicine?    A: There are many ways to give medicine for pain. Your doctor will help you decide which way might be best for you:           tablets or pills           intravenous (into a vein)           patient controlled analgesia pump           injection (shot) through the skin           shot or infusion in the spinal canal.  Q: What are the side effects?    A: All medicines have possible side effects. When side effects occur, it is usually within a few hours after taking the medicine. Most side effects can be managed and go away in time. Tell your doctor or nurse right away if you have:           constipation          sleepiness          dizziness          itching, rash or both           upset stomach or throwing up           slowed breathing           confusion.  Before You Go Home           Your doctor or health care team will give you directions for managing your pain at home. Be sure to have written instructions with a health care provider s name and number who will manage your pain after you go home.           It is important you follow your doctor s directions for taking pain medicine. If you need help, ask your doctor or pharmacist.           If you have concerns or side effects from pain medicine, call the doctor who prescribed the medicine,   or call your regular doctor.    2016 [employer] - A TRADEMARK OF [employer]  OTHER TRADEMARKS USED ARE OWNED BY THEIR RESPECTIVE OWNERS  THIS BROCHURE DOES NOT REPLACE MEDICAL OR PROFESSIONAL ADVICE; IT IS ONLY A GUIDE  pain--31307 (8/16)      Index   Biofeedback   ________________________________________________________________________  KEY POINTS    Biofeedback is a therapy that trains you to be aware of your body and  how it works. This may help reduce stress and give you more control over several kinds of health problems.    Biofeedback uses electronic equipment that gives you feedback about what is happening with your muscles, breathing, heartbeat, or other body functions. During treatment sessions you learn to relax, and notice how that changes the feedback.  ________________________________________________________________________  What is biofeedback?   Biofeedback is a therapy that trains you to be aware of your body and how it works. Your body controls your heart rate, blood pressure, and skin temperature without you thinking about it. You can learn to control some of your body processes with biofeedback. This may help reduce stress and give you more control over your health. Stress can cause or worsen many physical and mental health conditions.  When is it used?   Along with medical care, biofeedback can be helpful for:    Tension and migraine headaches    Pain such as chronic back or neck pain    High blood pressure    Anxiety disorders    Insomnia    Attention-deficit/hyperactivity disorder (ADHD)    Grinding of the teeth    Urinary incontinence    Constipation    Depression    Asthma    TMJ (jaw joint) problems    Irritable bowel syndrome    Chronic fatigue syndrome    Raynaud s Disease  What happens during a therapy session?   Your therapist will connect you to electronic equipment that gives you information about your body functions. The equipment gives feedback that you can see on a screen or hear with beeps or tones. The feedback tells you what is happening with your body functions. During the treatment sessions the biofeedback therapist will guide you to relax and pay attention to how you feel and how your feelings change the feedback.  There are several common types of biofeedback:    Thermal biofeedback: Small pads or clips are attached to your hand or foot to measure skin temperature. Skin temperature is a good  "way to tell how tense or relaxed you are. When you are relaxed, the skin temperature of your hands and feet rises.    Electromyogram (EMG) biofeedback: Small pads are put on your skin of the neck, back, or forehead to measure muscle activity.    Electroencephalogram (EEG) biofeedback: One or more small pads are placed on your scalp. They measure electrical activity in the brain.    Electrocardiogram (ECG or EKG): Small pads are put on your arms, legs, or other parts of your body to measure heart rate    Pneumograph: A gauge is placed around your chest to measure your breathing rate and patterns.  It may take between 6 and 30 therapy sessions to teach you how to control your symptoms. You may need to practice relaxation or muscle control for 5 or 10 minutes every day between biofeedback sessions. This helps you get more out of your treatment sessions. After you finish treatment, you may need occasional \"booster\" sessions to help keep the control you have learned.  How do I find a therapist?  Ask questions and get referrals from people you know and trust. You could check with:    Your healthcare provider    Friends or family members who have used biofeedback    Your health insurance company    Your employee assistance program (EAP) at work    Local mental health or human service agencies    Professional associations of psychologists, psychiatrists, or counselors  Developed by [employer].  Adult Advisor 2016.3 published by [employer].  Last modified: 2016-01-13  Last reviewed: 2015-12-17  This content is reviewed periodically and is subject to change as new health information becomes available. The information is intended to inform and educate and is not a replacement for medical evaluation, advice, diagnosis or treatment by a healthcare professional.  References   Adult Advisor 2016.3 Index    Copyright   2016 [employer], a division of [employer] All rights reserved.        Index [place]   Low Back " Pain Exercises   Exercises that stretch and strengthen the muscles of your abdomen and spine can help prevent back problems. Strong back and abdominal muscles help you keep good posture, with your spine in its correct position.  If your muscles are tight, take a warm shower or bath before doing the exercises. Exercise on a rug or mat. Wear loose clothing. Don t wear shoes. Stop doing any exercise that causes pain until you have talked with your healthcare provider.  Ask your provider or physical therapist to help you develop an exercise program. Ask your provider how many times a week you need to do the exercises. Remember to start slowly.   Exercises  These exercises are intended only as suggestions. Be sure to check with your provider before starting the exercises.     Abdominal drawing-in maneuver: Lie on your back with your knees bent and your feet flat on the floor. Try to pull your belly button in towards your spine. Hold this position for 15 seconds and then relax. Repeat 5 to 10 times.    Cat and camel: Get down on your hands and knees. Let your stomach sag, allowing your back to curve downward. Hold this position for 5 seconds. Then arch your back and hold for 5 seconds. Do 2 sets of 15.    Quadruped arm and leg raise: Get down on your hands and knees. Pull in your belly button and tighten your abdominal muscles to stiffen your spine. While keeping your abdominals tight, raise one arm and the opposite leg away from you. Hold this position for 5 seconds. Lower your arm and leg slowly and change sides. Do this 10 times on each side.    Pelvic tilt: Lie on your back with your knees bent and your feet flat on the floor. Pull your belly button in towards your spine and push your lower back into the floor, flattening your back. Hold this position for 15 seconds, then relax. Repeat 5 to 10 times.    Partial curl: Lie on your back with your knees bent and your feet flat on the floor. Draw in your abdomen and  tighten your stomach muscles. With your hands stretched out in front of you, curl your upper body forward until your shoulders clear the floor. Hold this position for 3 seconds. Don't hold your breath. It helps to breathe out as you lift your shoulders. Relax back to the floor. Repeat 10 times. Build to 2 sets of 15. To challenge yourself, clasp your hands behind your head and keep your elbows out to your sides.    Gluteal stretch: Lie on your back with both knees bent. Rest your right ankle over the knee of your left leg. Grasp the thigh of the left leg and pull toward your chest. You will feel a stretch along the buttocks and possibly along the outside of your hip. Hold the stretch for 15 to 30 seconds. Then repeat the exercise with your left ankle over your right knee. Do the exercise 3 times with each leg.    Extension exercise  1. Lie face down on the floor for 5 minutes. If this hurts too much, lie face down with a pillow under your stomach. This should relieve your leg or back pain. When you can lie on your stomach for 5 minutes without a pillow, you can continue with Part B of this exercise.  2. After lying on your stomach for 5 minutes, prop yourself up on your elbows for another 5 minutes. If you can do this without having more leg or buttock pain, you can start doing part C of this exercise.  3. Lie on your stomach with your hands under your shoulders. Then press down on your hands and extend your elbows while keeping your hips flat on the floor. Hold for 1 second and lower yourself to the floor. Do 3 to 5 sets of 10 repetitions. Rest for 1 minute between sets. You should have no pain in your legs when you do this, but it is normal to feel some pain in your lower back.  Do this exercise several times a day.    Side plank: Lie on your side with your legs, hips, and shoulders in a straight line. Prop yourself up onto your forearm with your elbow directly under your shoulder. Lift your hips off the floor and  balance on your forearm and the outside of your foot. Try to hold this position for 15 seconds and then slowly lower your hip to the ground. Switch sides and repeat. Work up to holding for 1 minute. This exercise can be made easier by starting with your knees and hips flexed toward your chest.    Prone plank: Lie on your stomach on the floor with your elbows bent and your forearms resting on the floor. Lift your hips and knees off the floor and try to stay in this position while keeping your back flat. Work up to holding this position for at least 1 minute. Do 3 sets.  Exercises to avoid   It s best to avoid the following exercises because they strain the lower back:    Exercises in which you lie on your back and raise and lower both legs together    Full sit-ups or sit-ups with straight legs    Hip twists    Squats with weights  Sports and other activities   In addition to strengthening your back muscles, it s helpful to keep your entire body in shape. Good activities for people with back problems include:    Walking    Bicycling    Swimming    Cross-country skiing    Yoga    Trevon Chi    Pilates  Some sports can hurt your back because of rough contact, twisting, sudden impact, or direct stress on your back. Sports that may be dangerous to your back include:    Basketball    Football    Soccer    Volleyball    Handball    Golf    Weight lifting    Trampoline    Tobogganing    Sledding    Snowmobiling    Snowboarding    Ice hockey  Developed by "[employer].  Adult Advisor 2016.3 published by "[employer].  Last modified: 2016-05-26  Last reviewed: 2016-05-18  This content is reviewed periodically and is subject to change as new health information becomes available. The information is intended to inform and educate and is not a replacement for medical evaluation, advice, diagnosis or treatment by a healthcare professional.  References   Adult Advisor 2016.3 Index    Copyright   2016 "[employer] a division of [employer] All rights reserved.

## 2017-12-28 NOTE — TELEPHONE ENCOUNTER
Patient Information     Patient Name MRN Sex Torres Mason [id number] Male 1970      Telephone Encounter by Philly Porras RN at 2017 10:10 AM     Author:  Philly Porras RN Service:  (none) Author Type:  NURS- Registered Nurse     Filed:  2017 10:28 AM Encounter Date:  2017 Status:  Signed     :  Philly Porras RN (NURS- Registered Nurse)            Pt called again today to insist he has been on 200 mg of trazodone at bedtime.  He states that the prescription was written when he was living in [place] but [facility] has no record of it.  I reminded pt he was supposed to establish care with a mental health provider, he said he had not called to make an appointment yet.  Please review and sign if appropriate.    Philly Porras RN ....................  2017   10:26 AM

## 2017-12-28 NOTE — TELEPHONE ENCOUNTER
Patient Information     Patient Name MRN Sex Torres Mason [id number] Male 1970      Telephone Encounter by Sophie Medina at 2017 11:28 AM     Author:  Sophie Medina Service:  (none) Author Type:  (none)     Filed:  2017 11:30 AM Encounter Date:  2017 Status:  Signed     :  Sophie Medina            Patient stopped at [address] window stating he would like to speak with PCP or nurse but both are out of office today. Patient would like a call back on Tuesday regarding lab done today, and recent medication changes. Please call home number or [phone] when possible.    Sophie Medina ....................  2017   11:29 AM

## 2017-12-28 NOTE — TELEPHONE ENCOUNTER
Patient Information     Patient Name MRN Sex Torres Mason [id number] Male 1970      Telephone Encounter by Erendira Briones RN at 2017  9:55 AM     Author:  Erendira Briones RN Service:  (none) Author Type:  NURS- Registered Nurse     Filed:  2017 10:11 AM Encounter Date:  2017 Status:  Signed     :  Erendira Brionse RN (NURS- Registered Nurse)            Flovent not covered. Pharmacy requesting prescription for Asmanex, Pulmicort or Qvar          This is a Refill request from: [facility]   Name of Medication: topiramate (NOT ON MED LIST)  Quantity requested: 90  Last fill date: unknown  Due for refill: unknown  Last visit with JULIA HUBER was on: 06/15/2017 in [facility]  PCP:  Julia Huber MD  Controlled Substance Agreement:  n/a   Diagnosis r/t this medication request: none associated         Name of Medication: ProAir  Quantity requested: 1 inhaler  Last fill date: 2014  Due for refill: yes  Last visit with JULIA HUBER was on: 06/15/2017 in [facility]  PCP:  Julia Huber MD  Controlled Substance Agreement:  n/a   Diagnosis r/t this medication request: none associated     Unable to complete prescription refill per RN Medication Refill Policy.................... Erendira Briones RN ....................  2017   10:09 AM                    Redundant Refill Request refused;  Medication:traZODone (DESYREL) 100 mg tablet    Qty:30 tablet   Ref:0  Start:2017        Sig:Take 1 tablet by mouth at bedtime.  Unable to complete prescription refill per RN Medication Refill Policy.................... Erendira Briones RN ....................  2017   9:55 AM

## 2017-12-30 NOTE — NURSING NOTE
Patient Information     Patient Name MRN Torres Silveira [id number] Male 1970      Nursing Note by Jessica Boss at 6/15/2017  1:45 PM     Author:  Jessica Boss Service:  (none) Author Type:  (none)     Filed:  6/15/2017  2:41 PM Encounter Date:  6/15/2017 Status:  Signed     :  Jessica Boss            Patient states that he is here for medication refills and he states he is not here for a physical.   Patient moved back home from [place].    PHQ Depression Screening 2015 6/15/2017   Date of PHQ exam (doc flow) 2015 6/15/2017   1. Lack of interest/pleasure 3 - Nearly every day 0 - Not at all   2. Feeling down/depressed 3 - Nearly every day 0 - Not at all   PHQ-2 TOTAL SCORE 6 0   3. Trouble sleeping 3 - Nearly every day 1 - Several days   4. Decreased energy 2 - More than half the days 1 - Several days   5. Appetite change 1 - Several days 0 - Not at all   6. Feelings of failure 1 - Several days 0 - Not at all   7. Trouble concentrating 2 - More than half the days 0 - Not at all   8. Activity level 3 - Nearly every day 0 - Not at all   9. Hurting yourself 0 - Not at all 0 - Not at all   PHQ-9 TOTAL SCORE 18 2   PHQ-9 Severity Level moderately severe none   Functional Impairment extremely difficult not difficult at all     ADRY-7 ANXIETY SCREENING 6/15/2017   ADRY date (doc flow) 6/15/2017   Nervous, anxious 0   Cannot stop worrying 0   Worry about different things 0   Cannot relax 0   Feeling restless 1   Easily annoyed/irritated 1   Afraid of awful event 0   Score 2   Severity none       Jessica Boss LPN........................6/15/2017  2:00 PM

## 2018-01-19 ENCOUNTER — COMMUNICATION - GICH (OUTPATIENT)
Dept: FAMILY MEDICINE | Facility: OTHER | Age: 48
End: 2018-01-19

## 2018-01-19 DIAGNOSIS — F41.9 ANXIETY DISORDER: ICD-10-CM

## 2018-01-25 VITALS
HEART RATE: 84 BPM | BODY MASS INDEX: 24.08 KG/M2 | HEIGHT: 71 IN | DIASTOLIC BLOOD PRESSURE: 60 MMHG | SYSTOLIC BLOOD PRESSURE: 110 MMHG | WEIGHT: 172 LBS

## 2018-01-29 ENCOUNTER — DOCUMENTATION ONLY (OUTPATIENT)
Dept: FAMILY MEDICINE | Facility: OTHER | Age: 48
End: 2018-01-29

## 2018-01-29 PROBLEM — R89.2 ABNORMAL TOXICOLOGICAL FINDINGS: Status: ACTIVE | Noted: 2017-07-22

## 2018-01-29 PROBLEM — F31.77 BIPOLAR 1 DISORDER, MIXED, PARTIAL REMISSION (H): Status: ACTIVE | Noted: 2017-06-15

## 2018-01-29 PROBLEM — F17.219 CIGARETTE NICOTINE DEPENDENCE WITH NICOTINE-INDUCED DISORDER: Status: ACTIVE | Noted: 2017-06-15

## 2018-01-29 RX ORDER — BUPROPION HYDROCHLORIDE 150 MG/1
150 TABLET ORAL EVERY MORNING
COMMUNITY
Start: 2017-06-15

## 2018-01-29 RX ORDER — ALBUTEROL SULFATE 90 UG/1
2 AEROSOL, METERED RESPIRATORY (INHALATION) 4 TIMES DAILY PRN
COMMUNITY
Start: 2017-07-20

## 2018-01-29 RX ORDER — LIDOCAINE 50 MG/G
PATCH TOPICAL
COMMUNITY
Start: 2017-06-15

## 2018-01-29 RX ORDER — MELOXICAM 7.5 MG/1
TABLET ORAL
COMMUNITY
Start: 2015-07-30

## 2018-01-29 RX ORDER — TOPIRAMATE 50 MG/1
50 TABLET, FILM COATED ORAL 2 TIMES DAILY
COMMUNITY
Start: 2017-07-20

## 2018-01-29 RX ORDER — LIDOCAINE 50 MG/G
PATCH TOPICAL
COMMUNITY
Start: 2015-06-08

## 2018-01-29 RX ORDER — LURASIDONE HYDROCHLORIDE 40 MG/1
40 TABLET, FILM COATED ORAL
COMMUNITY
Start: 2017-06-15

## 2018-01-29 RX ORDER — FLUTICASONE PROPIONATE 50 MCG
2 SPRAY, SUSPENSION (ML) NASAL DAILY
COMMUNITY
Start: 2015-07-08

## 2018-01-29 RX ORDER — FLUTICASONE PROPIONATE 110 UG/1
2 AEROSOL, METERED RESPIRATORY (INHALATION) 2 TIMES DAILY PRN
COMMUNITY
Start: 2015-05-24

## 2018-01-29 RX ORDER — MULTIVIT WITH MINERALS/LUTEIN
1000 TABLET ORAL DAILY
COMMUNITY
Start: 2015-04-13

## 2018-01-29 RX ORDER — LORATADINE 10 MG/1
10 TABLET ORAL DAILY
COMMUNITY
Start: 2015-01-27

## 2018-01-29 RX ORDER — DULOXETIN HYDROCHLORIDE 60 MG/1
60 CAPSULE, DELAYED RELEASE ORAL DAILY
COMMUNITY
Start: 2017-09-19

## 2018-01-29 RX ORDER — CLONAZEPAM 1 MG/1
TABLET ORAL
COMMUNITY
Start: 2017-06-15

## 2018-01-29 RX ORDER — LURASIDONE HYDROCHLORIDE 60 MG/1
60 TABLET, FILM COATED ORAL
COMMUNITY
Start: 2015-07-21

## 2018-01-29 RX ORDER — TRAZODONE HYDROCHLORIDE 100 MG/1
200 TABLET ORAL AT BEDTIME
COMMUNITY
Start: 2017-07-26

## 2018-01-29 RX ORDER — GLUCOSAMINE HCL 500 MG
1 TABLET ORAL DAILY
COMMUNITY
Start: 2015-04-13

## 2018-01-29 RX ORDER — SILDENAFIL 50 MG/1
50 TABLET, FILM COATED ORAL DAILY PRN
COMMUNITY
Start: 2017-06-15

## 2018-02-01 ASSESSMENT — ANXIETY QUESTIONNAIRES: GAD7 TOTAL SCORE: 2

## 2018-02-01 ASSESSMENT — PATIENT HEALTH QUESTIONNAIRE - PHQ9: SUM OF ALL RESPONSES TO PHQ QUESTIONS 1-9: 2

## 2018-02-13 NOTE — TELEPHONE ENCOUNTER
Patient Information     Patient Name MRN Sex Torres Mason [id number] Male 1970      Telephone Encounter by Philly Porras RN at 2018  3:54 PM     Author:  Philly Porras RN Service:  (none) Author Type:  NURS- Registered Nurse     Filed:  2018  3:55 PM Encounter Date:  2018 Status:  Signed     :  Philly Porars RN (NURS- Registered Nurse)            Pt has not returned calls, will follow up as needed but will close this encounter today  Philly Porras RN ....................  2018   3:55 PM

## 2018-02-13 NOTE — TELEPHONE ENCOUNTER
Patient Information     Patient Name MRN Sex Torres Mason [id number] Male 1970      Telephone Encounter by Philly Porras RN at 2018  3:57 PM     Author:  Philly Porras RN Service:  (none) Author Type:  NURS- Registered Nurse     Filed:  2018  4:17 PM Encounter Date:  2018 Status:  Signed     :  Philly Porras RN (NURS- Registered Nurse)            Pt was to establish care with a mental health provider.    Unable to complete prescription refill per RN Medication Refill Policy.................... Philly Porras RN ....................  2018   4:12 PM        Left message to call back  ....................Philly Porras RN  2018   4:17 PM

## 2025-03-11 ENCOUNTER — HOSPITAL ENCOUNTER (OUTPATIENT)
Dept: GENERAL RADIOLOGY | Facility: OTHER | Age: 55
Discharge: HOME | End: 2025-03-11
Payer: COMMERCIAL

## 2025-03-11 ENCOUNTER — OFFICE VISIT (OUTPATIENT)
Dept: FAMILY MEDICINE | Facility: OTHER | Age: 55
End: 2025-03-11
Payer: COMMERCIAL

## 2025-03-11 VITALS
SYSTOLIC BLOOD PRESSURE: 105 MMHG | HEIGHT: 71 IN | HEART RATE: 79 BPM | DIASTOLIC BLOOD PRESSURE: 73 MMHG | TEMPERATURE: 98 F | RESPIRATION RATE: 19 BRPM | OXYGEN SATURATION: 98 % | WEIGHT: 191 LBS | BODY MASS INDEX: 26.74 KG/M2

## 2025-03-11 DIAGNOSIS — M25.511 ACUTE SHOULDER PAIN DUE TO TRAUMA, RIGHT: ICD-10-CM

## 2025-03-11 DIAGNOSIS — G89.11 ACUTE SHOULDER PAIN DUE TO TRAUMA, RIGHT: ICD-10-CM
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
## 2025-03-11 DIAGNOSIS — R07.81 RIB PAIN ON RIGHT SIDE: ICD-10-CM

## 2025-03-11 DIAGNOSIS — M25.611 DECREASED RANGE OF MOTION OF RIGHT SHOULDER: ICD-10-CM

## 2025-03-11 PROCEDURE — 73030 X-RAY EXAM OF SHOULDER: CPT | Mod: RT

## 2025-03-11 PROCEDURE — G0463 HOSPITAL OUTPT CLINIC VISIT: HCPCS | Mod: 25

## 2025-03-11 PROCEDURE — 71100 X-RAY EXAM RIBS UNI 2 VIEWS: CPT | Mod: RT

## 2025-03-11 PROCEDURE — 250N000011 HC RX IP 250 OP 636: Mod: JZ

## 2025-03-11 PROCEDURE — 96372 THER/PROPH/DIAG INJ SC/IM: CPT

## 2025-03-11 RX ORDER — KETOROLAC TROMETHAMINE 10 MG/1
10 TABLET, FILM COATED ORAL EVERY 6 HOURS PRN
Qty: 20 TABLET | Refills: 0 | Status: SHIPPED | OUTPATIENT
Start: 2025-03-11

## 2025-03-11 RX ORDER — KETOROLAC TROMETHAMINE 30 MG/ML
30 INJECTION, SOLUTION INTRAMUSCULAR; INTRAVENOUS ONCE
Status: COMPLETED | OUTPATIENT
Start: 2025-03-11 | End: 2025-03-11

## 2025-03-11 RX ADMIN — KETOROLAC TROMETHAMINE 30 MG: 30 INJECTION, SOLUTION INTRAMUSCULAR at 12:02

## 2025-03-11 ASSESSMENT — ENCOUNTER SYMPTOMS
CARDIOVASCULAR NEGATIVE: 1
ARTHRALGIAS: 1
MYALGIAS: 1
GASTROINTESTINAL NEGATIVE: 1
RESPIRATORY NEGATIVE: 1
FEVER: 0
CHILLS: 0

## 2025-03-11 ASSESSMENT — PAIN SCALES - GENERAL: PAINLEVEL_OUTOF10: SEVERE PAIN (9)

## 2025-03-11 NOTE — PROGRESS NOTES
"Chief Complaint   Patient presents with    Musculoskeletal Problem     right    Chest Pain     ribs    Tumor Board   Patient is here to be seen after falling down stairs on 03/08/25.    FOOD SECURITY SCREENING QUESTIONS  Hunger Vital Signs:  Within the past 12 months we worried whether our food would run out before we got money to buy more. Never  Within the past 12 months the food we bought just didn't last and we didn't have money to get more. Never  Annie Anderson 3/11/2025 11:25 AM      Initial /73 (BP Location: Left arm, Patient Position: Sitting, Cuff Size: Adult Regular)   Pulse 79   Temp 98  F (36.7  C) (Tympanic)   Resp 19   Ht 1.803 m (5' 11\")   Wt 86.6 kg (191 lb)   SpO2 98%   BMI 26.64 kg/m   Estimated body mass index is 26.64 kg/m  as calculated from the following:    Height as of this encounter: 1.803 m (5' 11\").    Weight as of this encounter: 86.6 kg (191 lb).  Medication Reconciliation: complete    Annie Anderson   "

## 2025-03-11 NOTE — PROGRESS NOTES
Torres Hubbard  1970    ASSESSMENT/PLAN    1. Fall down stairs, initial encounter (Primary)  - XR Shoulder Right 2 Views  - Orthopedic  Referral; Future  2. Acute shoulder pain due to trauma, right  - XR Shoulder Right 2 Views  - ketorolac (TORADOL) injection 30 mg  - ketorolac (TORADOL) 10 MG tablet; Take 1 tablet (10 mg) by mouth every 6 hours as needed for moderate pain.  Dispense: 20 tablet; Refill: 0  - Orthopedic  Referral; Future  3. Decreased range of motion of right shoulder  - XR Shoulder Right 2 Views  - Wrist/Arm/Hand Bracing Supplies Order Sling; Right  - Orthopedic  Referral; Future  4. Rib pain on right side  - XR Ribs Right 2 Views  - ketorolac (TORADOL) injection 30 mg    Right shoulder x-ray completed  Per radiology: No acute fracture    Right rib x-ray completed  Per radiology: No pneumothorax or evidence of acute rib fracture.     - Toradol 30mg IM provided in clinic. Tolerated well.  - Toradol 10mg PO tablets provided for moderate pain.  - Sling provided for comfort and pain.  - Orthopedic referral placed for further management and evaluation of symptoms.   - May use over-the-counter Tylenol PRN  - Follow up as needed for new or worsening symptoms          *Explanation of diagnosis, treatment options and risk and benefits of medications reviewed with patient. Patient agrees with plan of care.  *All questions were answered.    *Red flags symptoms were discussed and patient was advised when they should return for reevaluation or for prompt emergency evaluation.   *Patient was given verbal and written instructions on plan of care. Instructions were printed or are available on [employer] on electronic AVS.   *We discussed potential side effects of any prescribed or recommended therapies, as well as expectations for response to treatments.  *Patient discharged in stable condition    Ken Salgado, KAREEM, APRN, FNP-C  [facility]    SUBJECTIVE  CHIEF  "COMPLAINT/ REASON FOR VISIT  Patient presents with:  Musculoskeletal Problem: right  Chest Pain: ribs     HISTORY OF PRESENT ILLNESS  Torres Hubbard is a pleasant 54 year old male presents to [facility] clinic today with shoulder and rib pain.  Patient reports he fell down the stairs (03/08/25) headfirst and tumbled down approximately 25 stairs.  He was not pushed on the stairs.  He did not lose consciousness or hit head. He reports pain and decreased range of motion to the right shoulder.  Additionally, his right anterior/lateral ribs are painful with breathing.  Patient denies any numbness or tingling.    History provided by patient       I have reviewed the nursing notes.  I have reviewed allergies, medication list, problem list, and past medical history.    REVIEW OF SYSTEMS  Review of Systems   Constitutional:  Negative for chills and fever.   HENT: Negative.     Respiratory: Negative.     Cardiovascular: Negative.    Gastrointestinal: Negative.    Musculoskeletal:  Positive for arthralgias and myalgias.        VITAL SIGNS  Vitals:    03/11/25 1121   BP: 105/73   BP Location: Left arm   Patient Position: Sitting   Cuff Size: Adult Regular   Pulse: 79   Resp: 19   Temp: 98  F (36.7  C)   TempSrc: Tympanic   SpO2: 98%   Weight: 86.6 kg (191 lb)   Height: 1.803 m (5' 11\")      Body mass index is 26.64 kg/m .      OBJECTIVE  PHYSICAL EXAM  Physical Exam  Vitals and nursing note reviewed.   Constitutional:       General: He is not in acute distress.     Appearance: Normal appearance. He is normal weight. He is not ill-appearing, toxic-appearing or diaphoretic.   HENT:      Head: Normocephalic and atraumatic.      Right Ear: Tympanic membrane, ear canal and external ear normal. There is no impacted cerumen.      Left Ear: Tympanic membrane, ear canal and external ear normal. There is no impacted cerumen.   Cardiovascular:      Rate and Rhythm: Normal rate and regular rhythm.      Pulses: Normal pulses.      Heart sounds: " Normal heart sounds.   Pulmonary:      Effort: Pulmonary effort is normal. No respiratory distress.      Breath sounds: Normal breath sounds. No wheezing or rhonchi.   Neurological:      Mental Status: He is alert.            DIAGNOSTICS  Results for orders placed or performed in visit on 03/11/25   XR Shoulder Right 2 Views     Status: None    Narrative    PROCEDURE:  XR SHOULDER RIGHT 2 VIEWS    HISTORY: Fell and tumbled down 20+ stairs, significant right shoulder  pain w/ decreased ROM; Decreased range of motion of right shoulder;  Acute shoulder pain due to trauma, right; Acute shoulder pain due to  trauma, right; Fall down stairs, initial encounter.    COMPARISON:  None.    TECHNIQUE:  2 views right shoulder.    FINDINGS:  No fracture or dislocation is identified. The joint spaces  are preserved. No foreign body is seen.       Impression    IMPRESSION: No acute fracture.      GEGE LA MD         SYSTEM ID:  [id number]   XR Ribs Right 2 Views     Status: None    Narrative    PROCEDURE:  XR RIBS RIGHT 2 VIEWS    HISTORY: anterior/lateral rib pain secondary to falling/tumbling down  stairs; Rib pain on right side, .    COMPARISON:  None.    FINDINGS:  The cardiomediastinal contours are normal. The trachea is midline.  No focal consolidation, effusion or pneumothorax.    No suspicious osseous lesion or subdiaphragmatic free air.      Impression    IMPRESSION:      No pneumothorax or evidence of acute rib fracture.    GEGE LA MD         SYSTEM ID:  [id number]

## 2025-03-17 ENCOUNTER — OFFICE VISIT (OUTPATIENT)
Dept: FAMILY MEDICINE | Facility: OTHER | Age: 55
End: 2025-03-17
Payer: COMMERCIAL

## 2025-03-17 VITALS
DIASTOLIC BLOOD PRESSURE: 88 MMHG | BODY MASS INDEX: 27.2 KG/M2 | HEART RATE: 80 BPM | WEIGHT: 195 LBS | TEMPERATURE: 98.8 F | SYSTOLIC BLOOD PRESSURE: 130 MMHG | OXYGEN SATURATION: 94 % | RESPIRATION RATE: 16 BRPM
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
## 2025-03-17 DIAGNOSIS — M12.511 TRAUMATIC ARTHROPATHY OF RIGHT SHOULDER: ICD-10-CM

## 2025-03-17 DIAGNOSIS — S46.011A RUPTURE OF RIGHT SUPRASPINATUS TENDON, INITIAL ENCOUNTER: Primary | ICD-10-CM

## 2025-03-17 PROCEDURE — G0463 HOSPITAL OUTPT CLINIC VISIT: HCPCS

## 2025-03-17 ASSESSMENT — PAIN SCALES - GENERAL: PAINLEVEL_OUTOF10: MODERATE PAIN (5)

## 2025-03-17 NOTE — PROGRESS NOTES
Sports Medicine Office Note    HPI:  54-year-old male coming in for evaluation of a right shoulder injury that occurred due to a fall down the stairs on 3/8.  He fell face first and rolled onto his right side as he fell.  He was carrying totes as he is currently in the process of moving.  Since the injury he has had difficulty lifting his arm.  He has significant pain about the shoulder.  He rates his pain at 5-9/10.  He characterizes the pain as sharp and achy.  He has difficulty with lifting his arm.  He was seen in [facility] clinic on 3/11.  He was provided a sling.  X-rays were negative.  Prior to this injury, no problems with his shoulder.      EXAM:  /88 (BP Location: Left arm, Patient Position: Sitting, Cuff Size: Adult Regular)   Pulse 80   Temp 98.8  F (37.1  C) (Temporal)   Resp 16   Wt 88.5 kg (195 lb)   SpO2 94%   BMI 27.20 kg/m    MUSCULOSKELETAL EXAM:  RIGHT SHOULDER  Inspection:  -No gross deformity  -No bruising or swelling  -Scars:  None    Tenderness:  -Diffuse tenderness to palpation    Range of Motion:  -Active flexion:  170 left, 20 right  -Active abduction:  170 left, 15 right    Strength:  -Supraspinatus:  1/5  -Infraspinatus:  4/5  -Subscapularis:  4/5    Other:  -Intact sensation to light touch distally.  -No signs of cyanosis. Normal skin temperature of the upper extremity.  -Elbow:  No gross deformity. Full range of motion.  -Hand/wrist:  No gross deformity. Full range of motion.  -Left shoulder:  No gross deformity. No palpable tenderness. Normal strength.      IMAGING:  3/7/2025: 2 view right shoulder x-ray  - No fracture, dislocation, or bony lesion      ASSESSMENT/PLAN:  Diagnoses and all orders for this visit:  Rupture of right supraspinatus tendon, initial encounter  -     Orthopedic  Referral  Traumatic arthropathy of right shoulder  -     Orthopedic  Referral  Fall down stairs, initial encounter  -     Orthopedic  Referral    54-year-old male with  what appears to be a significant rotator cuff injury due to a fall down the stairs.  X-rays from 3/11 were personally reviewed in the office with findings as demonstrated above by my interpretation.  We discussed the next steps for diagnosis and planning would be an MRI.  - MRI right shoulder  - Ice and OTC analgesics as needed  - Recommend gentle ROM as tolerated, active or passive  - We will call the patient with the results  - Further management based on the results of advanced imaging  - If supraspinatus tear/rupture is confirmed, recommend orthopedic consultation  - Patient does report potential third-party payer obstacle as he recently moved to [place] from [place] and does not have insurance yet, offered visit with financial advocates, patient declined      Joey Gottlieb MD  3/17/2025  1:20 PM    Total time spent with this patient was 30 minutes which included chart review, visualization and independent interpretation of images, time spent with the patient, and documentation.    Procedure time:  0 minute(s)

## 2025-03-17 NOTE — NURSING NOTE
Patient is here today for right shoulder pain, has decreased range of motion  Fell down steps 3/8/25  XR 3/11/25

## 2025-03-24 ENCOUNTER — TELEPHONE (OUTPATIENT)
Dept: FAMILY MEDICINE | Facility: OTHER | Age: 55
End: 2025-03-24
Payer: COMMERCIAL
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
## 2025-03-24 DIAGNOSIS — S46.011A RUPTURE OF RIGHT SUPRASPINATUS TENDON, INITIAL ENCOUNTER: Primary | ICD-10-CM

## 2025-03-24 DIAGNOSIS — M12.511 TRAUMATIC ARTHROPATHY OF RIGHT SHOULDER: ICD-10-CM

## 2025-03-24 NOTE — TELEPHONE ENCOUNTER
Patient notified that order has been placed and the referral team will contact him when his insurance approves

## 2025-03-24 NOTE — TELEPHONE ENCOUNTER
Patient called with questions regarding his visit with Dr. Gottlieb on 3.17.2025. He was under the impression that he would be getting a call back but hasn't heard anything. He stated that he thought an MRI was going to done but it does not appear that orders were placed. There hasn't been any improvement in his shoulder and he is wondering if Dr. Gottlieb would place the MRI order. Should be be seen for follow-up or wait until an MRI is completed? Please call to discuss and advise.  Samra Sevilla on 3/24/2025 at 2:45 PM

## 2025-03-24 NOTE — TELEPHONE ENCOUNTER
Patient states that he would like to move forward with MRI.  States his medicare should cover it from other state or the new insurance that he signed up for today     LOV     - MRI right shoulder  - Ice and OTC analgesics as needed  - Recommend gentle ROM as tolerated, active or passive  - We will call the patient with the results  - Further management based on the results of advanced imaging  - If supraspinatus tear/rupture is confirmed, recommend orthopedic consultation  - Patient does report potential third-party payer obstacle as he recently moved to [place] from [place] and does not have insurance yet, offered visit with financial advocates, patient declined

## 2025-04-10 ENCOUNTER — TELEPHONE (OUTPATIENT)
Dept: FAMILY MEDICINE | Facility: OTHER | Age: 55
End: 2025-04-10
Payer: COMMERCIAL

## 2025-04-10 NOTE — TELEPHONE ENCOUNTER
Left message with results below per provider.  Requested patient call clinic with any questions or concerns.    Danielle Zarco MD  P  Unit 1 Nurse  Team - please call patient with results.  Vitamin D is elevated, can hold off on any vitamin D supplement for now  Should recheck labs in about 6-8 weeks. Future lab ordered, just needs to complete this. Will plan to repeat elevated alk phos at that time as well    Fátima Witt LPN............4/10/2025 11:52 AM

## 2025-04-10 NOTE — TELEPHONE ENCOUNTER
[facility].      Okay to leave detailed message.      Monique Connor on 4/10/2025 at 10:33 AM

## 2025-04-29 ENCOUNTER — OFFICE VISIT (OUTPATIENT)
Dept: PSYCHIATRY | Facility: OTHER | Age: 55
End: 2025-04-29
Attending: NURSE PRACTITIONER
Payer: COMMERCIAL

## 2025-04-29 VITALS
BODY MASS INDEX: 27.55 KG/M2 | WEIGHT: 196.8 LBS | OXYGEN SATURATION: 95 % | RESPIRATION RATE: 16 BRPM | HEART RATE: 97 BPM | DIASTOLIC BLOOD PRESSURE: 76 MMHG | SYSTOLIC BLOOD PRESSURE: 108 MMHG | TEMPERATURE: 98.4 F | HEIGHT: 71 IN

## 2025-04-29 DIAGNOSIS — F41.1 GENERALIZED ANXIETY DISORDER: ICD-10-CM

## 2025-04-29 DIAGNOSIS — F20.3 UNDIFFERENTIATED SCHIZOPHRENIA (H): Primary | ICD-10-CM

## 2025-04-29 DIAGNOSIS — F31.81 BIPOLAR II DISORDER (H): ICD-10-CM

## 2025-04-29 DIAGNOSIS — F43.12 CHRONIC POST-TRAUMATIC STRESS DISORDER (PTSD): ICD-10-CM

## 2025-04-29 PROCEDURE — G0463 HOSPITAL OUTPT CLINIC VISIT: HCPCS

## 2025-04-29 ASSESSMENT — ASTHMA QUESTIONNAIRES: ACT_TOTALSCORE: 20

## 2025-04-29 ASSESSMENT — ANXIETY QUESTIONNAIRES
8. IF YOU CHECKED OFF ANY PROBLEMS, HOW DIFFICULT HAVE THESE MADE IT FOR YOU TO DO YOUR WORK, TAKE CARE OF THINGS AT HOME, OR GET ALONG WITH OTHER PEOPLE?: VERY DIFFICULT
6. BECOMING EASILY ANNOYED OR IRRITABLE: MORE THAN HALF THE DAYS
GAD7 TOTAL SCORE: 9
7. FEELING AFRAID AS IF SOMETHING AWFUL MIGHT HAPPEN: SEVERAL DAYS
7. FEELING AFRAID AS IF SOMETHING AWFUL MIGHT HAPPEN: SEVERAL DAYS
GAD7 TOTAL SCORE: 9
2. NOT BEING ABLE TO STOP OR CONTROL WORRYING: MORE THAN HALF THE DAYS
GAD7 TOTAL SCORE: 9
1. FEELING NERVOUS, ANXIOUS, OR ON EDGE: SEVERAL DAYS
IF YOU CHECKED OFF ANY PROBLEMS ON THIS QUESTIONNAIRE, HOW DIFFICULT HAVE THESE PROBLEMS MADE IT FOR YOU TO DO YOUR WORK, TAKE CARE OF THINGS AT HOME, OR GET ALONG WITH OTHER PEOPLE: VERY DIFFICULT
4. TROUBLE RELAXING: SEVERAL DAYS
3. WORRYING TOO MUCH ABOUT DIFFERENT THINGS: SEVERAL DAYS
5. BEING SO RESTLESS THAT IT IS HARD TO SIT STILL: SEVERAL DAYS

## 2025-04-29 ASSESSMENT — PATIENT HEALTH QUESTIONNAIRE - PHQ9
10. IF YOU CHECKED OFF ANY PROBLEMS, HOW DIFFICULT HAVE THESE PROBLEMS MADE IT FOR YOU TO DO YOUR WORK, TAKE CARE OF THINGS AT HOME, OR GET ALONG WITH OTHER PEOPLE: VERY DIFFICULT
SUM OF ALL RESPONSES TO PHQ QUESTIONS 1-9: 11
SUM OF ALL RESPONSES TO PHQ QUESTIONS 1-9: 11

## 2025-04-29 ASSESSMENT — PAIN SCALES - GENERAL: PAINLEVEL_OUTOF10: NO PAIN (0)

## 2025-04-29 NOTE — NURSING NOTE
"Chief Complaint   Patient presents with    Medication Therapy Management    PTSD    Referral    Anxiety    Manic Behavior    Depression    Schizophrenia   Patient presents to the Clinic today for the above concerns.    Initial /76 (BP Location: Right arm, Patient Position: Sitting, Cuff Size: Adult Regular)   Pulse 97   Temp 98.4  F (36.9  C) (Tympanic)   Resp 16   Ht 1.791 m (5' 10.5\")   Wt 89.3 kg (196 lb 12.8 oz)   SpO2 95%   BMI 27.84 kg/m   Estimated body mass index is 27.84 kg/m  as calculated from the following:    Height as of this encounter: 1.791 m (5' 10.5\").    Weight as of this encounter: 89.3 kg (196 lb 12.8 oz).  Medication Review: complete    The next two questions are to help us understand your food security.  If you are feeling you need any assistance in this area, we have resources available to support you today.          4/29/2025   SDOH- Food Insecurity   Within the past 12 months, did you worry that your food would run out before you got money to buy more? N   Within the past 12 months, did the food you bought just not last and you didn t have money to get more? N         Health Care Directive:  Patient does not have a Health Care Directive: Discussed advance care planning with patient; however, patient declined at this time.    Sophie Mir      "
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
## 2025-04-29 NOTE — PATIENT INSTRUCTIONS
No changes. PCP refills 90 days with 4 refills.   F/U every 3 months per patient request.   Message sent to PCP requesting refill on Plaquenil secondary to patient reporting he is almost out and it was not refilled at primary care appointment.

## 2025-04-30 ENCOUNTER — TELEPHONE (OUTPATIENT)
Dept: FAMILY MEDICINE | Facility: OTHER | Age: 55
End: 2025-04-30
Payer: COMMERCIAL

## 2025-04-30 NOTE — TELEPHONE ENCOUNTER
Message from patient's psych provider that he needs refill of plaquenil. I cannot find any records of him taking this or current dose. Can someone call patient to discuss with him the dose and get more info

## 2025-04-30 NOTE — TELEPHONE ENCOUNTER
Hydroxychloroquine noted on 2/1/25 - 200 mg tablets. Left message for patient to please call back to confirm.    Fátima Mir on 4/30/2025 at 2:59 PM

## 2025-05-01 NOTE — TELEPHONE ENCOUNTER
Left message on patient's home number and no answer on Mobile number.    Fátima Witt LPN............5/1/2025 1:06 PM

## 2025-05-19 ENCOUNTER — PATIENT OUTREACH (OUTPATIENT)
Dept: CARE COORDINATION | Facility: CLINIC | Age: 55
End: 2025-05-19
Payer: COMMERCIAL

## 2025-07-11 DIAGNOSIS — F43.10 PTSD (POST-TRAUMATIC STRESS DISORDER): ICD-10-CM

## 2025-07-16 RX ORDER — PRAZOSIN HYDROCHLORIDE 1 MG/1
1 CAPSULE ORAL AT BEDTIME
Qty: 90 CAPSULE | Refills: 4 | OUTPATIENT
Start: 2025-07-16

## 2025-07-16 NOTE — TELEPHONE ENCOUNTER
[id number] sent Rx request for the following:      Requested Prescriptions   Pending Prescriptions Disp Refills    prazosin (MINIPRESS) 1 MG capsule 90 capsule 4     Sig: Take 1 capsule (1 mg) by mouth at bedtime.       Alpha Blockers Failed - 7/16/2025  9:41 AM        Failed - Patient does not have Tadalafil, Vardenafil, or Sildenafil on their medication list      Last Prescription Date:   4/4/2025  Last Fill Qty/Refills:         90, R-4        Unable to complete prescription refill per RN Medication Refill Policy.   Redundant refill request refused: Too soon:  Shawn Krishna, RN on 7/16/2025 at 9:43 AM

## 2025-07-21 ENCOUNTER — TELEPHONE (OUTPATIENT)
Dept: FAMILY MEDICINE | Facility: OTHER | Age: 55
End: 2025-07-21
Payer: COMMERCIAL

## 2025-07-21 DIAGNOSIS — F43.10 PTSD (POST-TRAUMATIC STRESS DISORDER): ICD-10-CM

## 2025-07-21 RX ORDER — PRAZOSIN HYDROCHLORIDE 1 MG/1
1 CAPSULE ORAL AT BEDTIME
Qty: 450 CAPSULE | Refills: 0 | Status: CANCELLED | OUTPATIENT
Start: 2025-07-21

## 2025-07-21 NOTE — TELEPHONE ENCOUNTER
"S-(situation): Pharmacy sent a note requesting patients Rx for Prazosin be changed from \"1 mg capss - take 1 at bedtime\" to 1 mg caps-take 5 at bedtime\"    B-(background): Spoke with patient.  He just got a refill on the prazosin and does not need at this time.  States he has been taking 5 caps at bedtime for years.  He is no longer seeing this provider.  Has follow up with Josh Adams on 7/29/25.  Verified with pharmacy medication was filled on 7/10/25 with the ordering provider Debbie Linares    A-(assessment): Our medication list is incorrect for what patient reports he is taking    R-(recommendations): Routing to Josh Adams for an awareness so this can be reviewed at patients appointment and med list updated as indicated.     Jes Lopez RN on 7/22/2025 at 2:19 PM        Note from pharmacy:  Patient had been taking 5 per night, for 5mg dose. This RX for 1mg HS, could we get RX for 5mg dose with ?    Deanna Molina RN on 7/21/2025 at 10:53 AM          "

## 2025-07-29 ENCOUNTER — OFFICE VISIT (OUTPATIENT)
Dept: PSYCHIATRY | Facility: OTHER | Age: 55
End: 2025-07-29
Attending: NURSE PRACTITIONER
Payer: COMMERCIAL

## 2025-07-29 VITALS
BODY MASS INDEX: 28.55 KG/M2 | DIASTOLIC BLOOD PRESSURE: 76 MMHG | HEART RATE: 88 BPM | OXYGEN SATURATION: 95 % | SYSTOLIC BLOOD PRESSURE: 114 MMHG | RESPIRATION RATE: 16 BRPM | WEIGHT: 201.8 LBS | TEMPERATURE: 98.7 F

## 2025-07-29 DIAGNOSIS — F43.12 CHRONIC POST-TRAUMATIC STRESS DISORDER (PTSD): ICD-10-CM

## 2025-07-29 DIAGNOSIS — F43.10 PTSD (POST-TRAUMATIC STRESS DISORDER): ICD-10-CM

## 2025-07-29 DIAGNOSIS — F20.3 UNDIFFERENTIATED SCHIZOPHRENIA (H): Primary | ICD-10-CM

## 2025-07-29 DIAGNOSIS — F41.9 ANXIETY: ICD-10-CM

## 2025-07-29 DIAGNOSIS — F31.60 BIPOLAR AFFECTIVE DISORDER, CURRENT EPISODE MIXED, CURRENT EPISODE SEVERITY UNSPECIFIED (H): ICD-10-CM

## 2025-07-29 PROCEDURE — G0463 HOSPITAL OUTPT CLINIC VISIT: HCPCS

## 2025-07-29 RX ORDER — PRAZOSIN HYDROCHLORIDE 5 MG/1
5 CAPSULE ORAL AT BEDTIME
Qty: 90 CAPSULE | Refills: 4 | Status: SHIPPED | OUTPATIENT
Start: 2025-07-29

## 2025-07-29 RX ORDER — LURASIDONE HYDROCHLORIDE 120 MG/1
120 TABLET, FILM COATED ORAL DAILY
Qty: 30 TABLET | Refills: 4 | Status: SHIPPED | OUTPATIENT
Start: 2025-07-29

## 2025-07-29 ASSESSMENT — ANXIETY QUESTIONNAIRES
IF YOU CHECKED OFF ANY PROBLEMS ON THIS QUESTIONNAIRE, HOW DIFFICULT HAVE THESE PROBLEMS MADE IT FOR YOU TO DO YOUR WORK, TAKE CARE OF THINGS AT HOME, OR GET ALONG WITH OTHER PEOPLE: SOMEWHAT DIFFICULT
GAD7 TOTAL SCORE: 8
8. IF YOU CHECKED OFF ANY PROBLEMS, HOW DIFFICULT HAVE THESE MADE IT FOR YOU TO DO YOUR WORK, TAKE CARE OF THINGS AT HOME, OR GET ALONG WITH OTHER PEOPLE?: SOMEWHAT DIFFICULT
2. NOT BEING ABLE TO STOP OR CONTROL WORRYING: SEVERAL DAYS
1. FEELING NERVOUS, ANXIOUS, OR ON EDGE: SEVERAL DAYS
GAD7 TOTAL SCORE: 8
3. WORRYING TOO MUCH ABOUT DIFFERENT THINGS: SEVERAL DAYS
5. BEING SO RESTLESS THAT IT IS HARD TO SIT STILL: SEVERAL DAYS
7. FEELING AFRAID AS IF SOMETHING AWFUL MIGHT HAPPEN: SEVERAL DAYS
GAD7 TOTAL SCORE: 8
7. FEELING AFRAID AS IF SOMETHING AWFUL MIGHT HAPPEN: SEVERAL DAYS
4. TROUBLE RELAXING: MORE THAN HALF THE DAYS
6. BECOMING EASILY ANNOYED OR IRRITABLE: SEVERAL DAYS

## 2025-07-29 ASSESSMENT — PAIN SCALES - GENERAL: PAINLEVEL_OUTOF10: NO PAIN (0)

## 2025-07-29 ASSESSMENT — PATIENT HEALTH QUESTIONNAIRE - PHQ9
SUM OF ALL RESPONSES TO PHQ QUESTIONS 1-9: 8
10. IF YOU CHECKED OFF ANY PROBLEMS, HOW DIFFICULT HAVE THESE PROBLEMS MADE IT FOR YOU TO DO YOUR WORK, TAKE CARE OF THINGS AT HOME, OR GET ALONG WITH OTHER PEOPLE: SOMEWHAT DIFFICULT
SUM OF ALL RESPONSES TO PHQ QUESTIONS 1-9: 8
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
## 2025-07-29 NOTE — PATIENT INSTRUCTIONS
Medication:   Continue Latuda 120mg at bed (instructed to take with 300 fat cals, so maybe switch to dinner time)  Continue Cymbalta 90mg daily  Continue Wellbutrin XL 150mg daily  Continue Minipress 5mg at bed  Continue Seroquel 150mg at bed  Continue Trazodone 150mg at bed    Psychotherapy: None  Labs: No labs today  F/U: 3 months

## 2025-08-04 DIAGNOSIS — M25.50 POLYARTHRALGIA: ICD-10-CM

## 2025-08-11 RX ORDER — HYDROXYCHLOROQUINE SULFATE 200 MG/1
200 TABLET, FILM COATED ORAL DAILY
Qty: 90 TABLET | Refills: 0 | Status: SHIPPED | OUTPATIENT
Start: 2025-08-11

## (undated) RX ORDER — KETOROLAC TROMETHAMINE 30 MG/ML
INJECTION, SOLUTION INTRAMUSCULAR; INTRAVENOUS
Status: DISPENSED
Start: 2025-03-11